# Patient Record
(demographics unavailable — no encounter records)

---

## 2024-10-01 NOTE — PHYSICAL EXAM
[No Acute Distress] : no acute distress [Well Nourished] : well nourished [Well Developed] : well developed [Well-Appearing] : well-appearing [Normal Sclera/Conjunctiva] : normal sclera/conjunctiva [PERRL] : pupils equal round and reactive to light [EOMI] : extraocular movements intact [Normal Outer Ear/Nose] : the outer ears and nose were normal in appearance [Normal Oropharynx] : the oropharynx was normal [No JVD] : no jugular venous distention [Supple] : supple [No Lymphadenopathy] : no lymphadenopathy [Thyroid Normal, No Nodules] : the thyroid was normal and there were no nodules present [No Respiratory Distress] : no respiratory distress  [No Accessory Muscle Use] : no accessory muscle use [Clear to Auscultation] : lungs were clear to auscultation bilaterally [Normal Rate] : normal rate  [Regular Rhythm] : with a regular rhythm [No Murmur] : no murmur heard [Normal S1, S2] : normal S1 and S2 [No Varicosities] : no varicosities [Pedal Pulses Present] : the pedal pulses are present [No Edema] : there was no peripheral edema [No Extremity Clubbing/Cyanosis] : no extremity clubbing/cyanosis [Soft] : abdomen soft [Non Tender] : non-tender [Non-distended] : non-distended [No Masses] : no abdominal mass palpated [No HSM] : no HSM [Normal Bowel Sounds] : normal bowel sounds [No CVA Tenderness] : no CVA  tenderness [No Spinal Tenderness] : no spinal tenderness [No Joint Swelling] : no joint swelling [Grossly Normal Strength/Tone] : grossly normal strength/tone [No Rash] : no rash [Coordination Grossly Intact] : coordination grossly intact [No Focal Deficits] : no focal deficits [Normal Gait] : normal gait [Deep Tendon Reflexes (DTR)] : deep tendon reflexes were 2+ and symmetric [Normal Affect] : the affect was normal [Normal Insight/Judgement] : insight and judgment were intact

## 2024-10-01 NOTE — PHYSICAL EXAM
[No Acute Distress] : no acute distress [Well Nourished] : well nourished [Well Developed] : well developed [Well-Appearing] : well-appearing [Normal Sclera/Conjunctiva] : normal sclera/conjunctiva [PERRL] : pupils equal round and reactive to light [EOMI] : extraocular movements intact [Normal Outer Ear/Nose] : the outer ears and nose were normal in appearance [Normal Oropharynx] : the oropharynx was normal [No JVD] : no jugular venous distention [No Lymphadenopathy] : no lymphadenopathy [Supple] : supple [Thyroid Normal, No Nodules] : the thyroid was normal and there were no nodules present [No Respiratory Distress] : no respiratory distress  [No Accessory Muscle Use] : no accessory muscle use [Clear to Auscultation] : lungs were clear to auscultation bilaterally [Normal Rate] : normal rate  [Regular Rhythm] : with a regular rhythm [Normal S1, S2] : normal S1 and S2 [No Murmur] : no murmur heard [No Varicosities] : no varicosities [Pedal Pulses Present] : the pedal pulses are present [No Edema] : there was no peripheral edema [No Extremity Clubbing/Cyanosis] : no extremity clubbing/cyanosis [Soft] : abdomen soft [Non Tender] : non-tender [Non-distended] : non-distended [No Masses] : no abdominal mass palpated [No HSM] : no HSM [Normal Bowel Sounds] : normal bowel sounds [No CVA Tenderness] : no CVA  tenderness [No Joint Swelling] : no joint swelling [No Spinal Tenderness] : no spinal tenderness [Grossly Normal Strength/Tone] : grossly normal strength/tone [No Rash] : no rash [Coordination Grossly Intact] : coordination grossly intact [No Focal Deficits] : no focal deficits [Normal Gait] : normal gait [Deep Tendon Reflexes (DTR)] : deep tendon reflexes were 2+ and symmetric [Normal Affect] : the affect was normal [Normal Insight/Judgement] : insight and judgment were intact

## 2024-10-08 NOTE — HISTORY OF PRESENT ILLNESS
[de-identified] : 44 years old female has past medical history of allergic rhinitis, anxiety and depression, fibromyalgia, GERD, hypercholesterolemia, and overweight, came back for annual physical exam.

## 2024-10-08 NOTE — HISTORY OF PRESENT ILLNESS
[de-identified] : 44 years old female has past medical history of allergic rhinitis, anxiety and depression, fibromyalgia, GERD, hypercholesterolemia, and overweight, came back for annual physical exam.

## 2024-10-08 NOTE — HEALTH RISK ASSESSMENT
[Good] : ~his/her~  mood as  good [No] : In the past 12 months have you used drugs other than those required for medical reasons? No [0] : 2) Feeling down, depressed, or hopeless: Not at all (0) [PHQ-2 Negative - No further assessment needed] : PHQ-2 Negative - No further assessment needed [Never] : Never [Patient reported mammogram was normal] : Patient reported mammogram was normal [Patient reported PAP Smear was normal] : Patient reported PAP Smear was normal [With Family] : lives with family [Single] : single [Fully functional (bathing, dressing, toileting, transferring, walking, feeding)] : Fully functional (bathing, dressing, toileting, transferring, walking, feeding) [Fully functional (using the telephone, shopping, preparing meals, housekeeping, doing laundry, using] : Fully functional and needs no help or supervision to perform IADLs (using the telephone, shopping, preparing meals, housekeeping, doing laundry, using transportation, managing medications and managing finances) [Designated Healthcare Proxy] : Designated healthcare proxy [Relationship: ___] : Relationship: [unfilled] [[id number]] : 0 [MammogramDate] : 2024 [PapSmearDate] : 2024 [AdvancecareDate] : 10/8/24

## 2024-10-08 NOTE — HEALTH RISK ASSESSMENT
[Good] : ~his/her~  mood as  good [No] : In the past 12 months have you used drugs other than those required for medical reasons? No [0] : 2) Feeling down, depressed, or hopeless: Not at all (0) [PHQ-2 Negative - No further assessment needed] : PHQ-2 Negative - No further assessment needed [Never] : Never [Patient reported mammogram was normal] : Patient reported mammogram was normal [Patient reported PAP Smear was normal] : Patient reported PAP Smear was normal [With Family] : lives with family [Single] : single [Fully functional (bathing, dressing, toileting, transferring, walking, feeding)] : Fully functional (bathing, dressing, toileting, transferring, walking, feeding) [Fully functional (using the telephone, shopping, preparing meals, housekeeping, doing laundry, using] : Fully functional and needs no help or supervision to perform IADLs (using the telephone, shopping, preparing meals, housekeeping, doing laundry, using transportation, managing medications and managing finances) [Designated Healthcare Proxy] : Designated healthcare proxy [Relationship: ___] : Relationship: [unfilled] [[id number]] : 0 [MammogramDate] : 2024 [PapSmearDate] : 2024 [AdvancecareDate] : 10/8/24

## 2024-10-18 NOTE — HISTORY OF PRESENT ILLNESS
[Home] : at home, [unfilled] , at the time of the visit. [Medical Office: ([facility])___] : at the medical office located in  [Verbal consent obtained from patient] : the patient, [unfilled] [FreeTextEntry1] : Contacted patient to discuss transvaginal ultrasound showing stable 5.6cm left ovarian simple cyst.  Patient states intermittently for the last year she has been feeling tugging sensation and occasional pain.  Patient previously was unsure if she wanted to undergo surgery or continue with surveillance.  Despite noted stability of ovarian cyst explained to the patient they are still possibilities of ovarian torsion especially if she if she is feeling symptoms of tugging and occasional pain.  Patient states if she were to undergo surgery she would prefer to have it completed after the holidays.  Advised patient she should undergo 1 more ultrasound and in person visit in order to discuss the possibility of laparoscopic LSO.  Explained to the patient that if she no longer desires future fertility, in the setting of adnexal mass at the age of 44 patient should undergo oophorectomy as a preferred method over cystectomy.  Patient understands and agrees to interval ultrasound and further discussion about possible surgery.  Ovarian torsion precautions given to the patient.

## 2025-01-31 NOTE — HEALTH RISK ASSESSMENT
[Patient reported colonoscopy was normal] : Patient reported colonoscopy was normal [Good] : ~his/her~  mood as  good [No] : In the past 12 months have you used drugs other than those required for medical reasons? No [0] : 2) Feeling down, depressed, or hopeless: Not at all (0) [PHQ-2 Negative - No further assessment needed] : PHQ-2 Negative - No further assessment needed [Never] : Never [Patient reported mammogram was normal] : Patient reported mammogram was normal [Patient reported PAP Smear was normal] : Patient reported PAP Smear was normal [[id number]] : 0 [MammogramDate] : 2024 [PapSmearDate] : 12/24 [ColonoscopyDate] : 11/24

## 2025-01-31 NOTE — HEALTH RISK ASSESSMENT
[Patient reported colonoscopy was normal] : Patient reported colonoscopy was normal [Good] : ~his/her~  mood as  good [No] : In the past 12 months have you used drugs other than those required for medical reasons? No [0] : 2) Feeling down, depressed, or hopeless: Not at all (0) [PHQ-2 Negative - No further assessment needed] : PHQ-2 Negative - No further assessment needed [Never] : Never [Patient reported mammogram was normal] : Patient reported mammogram was normal [Patient reported PAP Smear was normal] : Patient reported PAP Smear was normal [[id number]] : 0 [MammogramDate] : 2024 [PapSmearDate] : 12/24 [ColonoscopyDate] : 11/24

## 2025-01-31 NOTE — HISTORY OF PRESENT ILLNESS
[de-identified] : 45 years old female with past medical history of allergic rhinitis, anxiety and depression, fibromyalgia, GERD, hypercholesterolemia, and overweight came back for annual physical exam.  Pt brought job form to be filled.

## 2025-01-31 NOTE — HISTORY OF PRESENT ILLNESS
[de-identified] : 45 years old female with past medical history of allergic rhinitis, anxiety and depression, fibromyalgia, GERD, hypercholesterolemia, and overweight came back for annual physical exam.  Pt brought job form to be filled.

## 2025-06-08 NOTE — HISTORY OF PRESENT ILLNESS
[FreeTextEntry1] : MINDY ZAMBRANO ,46YO,  Presents for menopause discussion  Patient would like to check her cyst. Patient complains about different flows during her menstruation.  Patient reports intermenstrual spotting x 2 months.  Increased irritability and more emotional and complaining of brain fog x 9 month with increased severity of symptoms of the past 2 month. Patient reports no change in libido.  OBHX:   x 2 GYNHX :(Cysts) LMP: 2025Regular Mense's Not sexually active right now Last pap unsure Last Mammo in